# Patient Record
Sex: FEMALE | Race: WHITE | NOT HISPANIC OR LATINO | URBAN - METROPOLITAN AREA
[De-identification: names, ages, dates, MRNs, and addresses within clinical notes are randomized per-mention and may not be internally consistent; named-entity substitution may affect disease eponyms.]

---

## 2019-07-19 ENCOUNTER — OUTPATIENT (OUTPATIENT)
Dept: OUTPATIENT SERVICES | Facility: HOSPITAL | Age: 84
LOS: 1 days | End: 2019-07-19
Payer: MEDICARE

## 2019-07-19 PROBLEM — Z00.00 ENCOUNTER FOR PREVENTIVE HEALTH EXAMINATION: Status: ACTIVE | Noted: 2019-07-19

## 2019-07-20 PROCEDURE — 93010 ELECTROCARDIOGRAM REPORT: CPT

## 2019-07-25 ENCOUNTER — TRANSCRIPTION ENCOUNTER (OUTPATIENT)
Age: 84
End: 2019-07-25

## 2019-07-25 ENCOUNTER — OUTPATIENT (OUTPATIENT)
Dept: OUTPATIENT SERVICES | Facility: HOSPITAL | Age: 84
LOS: 1 days | End: 2019-07-25

## 2019-08-22 ENCOUNTER — OUTPATIENT (OUTPATIENT)
Dept: OUTPATIENT SERVICES | Facility: HOSPITAL | Age: 84
LOS: 1 days | End: 2019-08-22

## 2021-07-06 ENCOUNTER — OUTPATIENT (OUTPATIENT)
Dept: OUTPATIENT SERVICES | Facility: HOSPITAL | Age: 86
LOS: 1 days | End: 2021-07-06

## 2021-11-29 ENCOUNTER — APPOINTMENT (OUTPATIENT)
Dept: CT IMAGING | Facility: CLINIC | Age: 86
End: 2021-11-29
Payer: MEDICARE

## 2021-11-29 PROCEDURE — 74177 CT ABD & PELVIS W/CONTRAST: CPT | Mod: MH

## 2022-08-11 ENCOUNTER — OUTPATIENT (OUTPATIENT)
Dept: OUTPATIENT SERVICES | Facility: HOSPITAL | Age: 87
LOS: 1 days | End: 2022-08-11

## 2022-08-11 PROCEDURE — 93010 ELECTROCARDIOGRAM REPORT: CPT

## 2022-08-14 ENCOUNTER — NON-APPOINTMENT (OUTPATIENT)
Age: 87
End: 2022-08-14

## 2022-08-15 DIAGNOSIS — Z01.812 ENCOUNTER FOR PREPROCEDURAL LABORATORY EXAMINATION: ICD-10-CM

## 2022-08-15 DIAGNOSIS — K64.2 THIRD DEGREE HEMORRHOIDS: ICD-10-CM

## 2022-08-15 DIAGNOSIS — Z01.810 ENCOUNTER FOR PREPROCEDURAL CARDIOVASCULAR EXAMINATION: ICD-10-CM

## 2022-08-19 ENCOUNTER — OUTPATIENT (OUTPATIENT)
Dept: OUTPATIENT SERVICES | Facility: HOSPITAL | Age: 87
LOS: 1 days | End: 2022-08-19
Payer: MEDICARE

## 2022-08-19 PROCEDURE — 88305 TISSUE EXAM BY PATHOLOGIST: CPT | Mod: 26

## 2022-08-21 ENCOUNTER — NON-APPOINTMENT (OUTPATIENT)
Age: 87
End: 2022-08-21

## 2022-08-25 ENCOUNTER — OUTPATIENT (OUTPATIENT)
Dept: OUTPATIENT SERVICES | Facility: HOSPITAL | Age: 87
LOS: 1 days | End: 2022-08-25

## 2022-08-25 PROCEDURE — 88304 TISSUE EXAM BY PATHOLOGIST: CPT | Mod: 26

## 2022-09-01 DIAGNOSIS — K64.9 UNSPECIFIED HEMORRHOIDS: ICD-10-CM

## 2022-09-05 DIAGNOSIS — K64.9 UNSPECIFIED HEMORRHOIDS: ICD-10-CM

## 2022-09-12 ENCOUNTER — OUTPATIENT (OUTPATIENT)
Dept: EMERGENCY DEPT | Facility: HOSPITAL | Age: 87
LOS: 1 days | End: 2022-09-12

## 2022-09-12 PROCEDURE — 99285 EMERGENCY DEPT VISIT HI MDM: CPT

## 2022-09-19 DIAGNOSIS — K91.840 POSTPROCEDURAL HEMORRHAGE OF A DIGESTIVE SYSTEM ORGAN OR STRUCTURE FOLLOWING A DIGESTIVE SYSTEM PROCEDURE: ICD-10-CM

## 2022-11-07 ENCOUNTER — OFFICE (OUTPATIENT)
Dept: URBAN - METROPOLITAN AREA CLINIC 8 | Facility: CLINIC | Age: 87
Setting detail: OPHTHALMOLOGY
End: 2022-11-07
Payer: MEDICARE

## 2022-11-07 DIAGNOSIS — H00.15: ICD-10-CM

## 2022-11-07 PROCEDURE — 99213 OFFICE O/P EST LOW 20 MIN: CPT | Performed by: OPHTHALMOLOGY

## 2022-11-07 ASSESSMENT — REFRACTION_CURRENTRX
OS_OVR_VA: 20/
OD_CYLINDER: -0.75
OS_ADD: +2.75
OD_ADD: +2.75
OS_AXIS: 149
OS_AXIS: 154
OS_ADD: +2.75
OS_SPHERE: -1.25
OS_VPRISM_DIRECTION: PROGS
OS_SPHERE: -0.50
OD_SPHERE: +0.25
OD_OVR_VA: 20/
OD_CYLINDER: SPHERE
OD_VPRISM_DIRECTION: PROGS
OS_VPRISM_DIRECTION: PROGS
OD_SPHERE: -0.25
OD_OVR_VA: 20/
OD_AXIS: 064
OS_CYLINDER: -3.25
OS_OVR_VA: 20/
OD_VPRISM_DIRECTION: PROGS
OS_CYLINDER: -3.00
OD_ADD: +2.75

## 2022-11-07 ASSESSMENT — SPHEQUIV_DERIVED
OD_SPHEQUIV: 0.25
OS_SPHEQUIV: -2.375
OD_SPHEQUIV: 0.125
OS_SPHEQUIV: -3
OD_SPHEQUIV: 0.125
OS_SPHEQUIV: -2.125

## 2022-11-07 ASSESSMENT — REFRACTION_AUTOREFRACTION
OS_CYLINDER: -4.50
OS_AXIS: 148
OD_CYLINDER: -0.50
OD_AXIS: 109
OD_SPHERE: +0.50
OS_SPHERE: -0.75

## 2022-11-07 ASSESSMENT — AXIALLENGTH_DERIVED
OS_AL: 25.1667
OD_AL: 24.2013
OS_AL: 25.4469
OD_AL: 24.2013
OD_AL: 24.1502
OS_AL: 25.0564

## 2022-11-07 ASSESSMENT — REFRACTION_MANIFEST
OD_CYLINDER: -0.25
OS_CYLINDER: -3.25
OS_ADD: +2.75
OS_VA1: 20/NI
OS_AXIS: 150
OD_AXIS: 110
OU_VA: 20/25-2
OS_SPHERE: -0.50
OD_SPHERE: +0.25
OD_CYLINDER: -0.25
OD_VA1: 20/30
OS_SPHERE: -0.75
OD_VA2: 20/25
OS_VA1: 20/NI
OD_ADD: +2.75
OD_VA1: 20/30
OD_ADD: +2.75
OS_CYLINDER: -3.25
OD_VA2: 20/25
OS_AXIS: 150
OD_AXIS: 110
OS_VA2: 20/25
OD_SPHERE: +0.25
OS_VA2: 20/25
OU_VA: 20/25-2
OS_ADD: +2.75

## 2022-11-07 ASSESSMENT — VISUAL ACUITY
OS_BCVA: 20/40
OD_BCVA: 20/150

## 2022-11-07 ASSESSMENT — KERATOMETRY
OS_K2POWER_DIOPTERS: 44.00
OS_K1POWER_DIOPTERS: 40.00
OD_K2POWER_DIOPTERS: 41.75
OD_AXISANGLE_DEGREES: 090
OS_AXISANGLE_DEGREES: 057
METHOD_AUTO_MANUAL: AUTO
OD_K1POWER_DIOPTERS: 41.75

## 2022-11-07 ASSESSMENT — CONFRONTATIONAL VISUAL FIELD TEST (CVF)
OD_FINDINGS: FULL
OS_FINDINGS: FULL

## 2023-03-05 ENCOUNTER — NON-APPOINTMENT (OUTPATIENT)
Age: 88
End: 2023-03-05

## 2023-10-17 ENCOUNTER — OFFICE (OUTPATIENT)
Dept: URBAN - METROPOLITAN AREA CLINIC 8 | Facility: CLINIC | Age: 88
Setting detail: OPHTHALMOLOGY
End: 2023-10-17
Payer: MEDICARE

## 2023-10-17 DIAGNOSIS — Z96.1: ICD-10-CM

## 2023-10-17 DIAGNOSIS — H35.373: ICD-10-CM

## 2023-10-17 DIAGNOSIS — D31.31: ICD-10-CM

## 2023-10-17 DIAGNOSIS — H43.811: ICD-10-CM

## 2023-10-17 DIAGNOSIS — H52.4: ICD-10-CM

## 2023-10-17 PROCEDURE — 92015 DETERMINE REFRACTIVE STATE: CPT

## 2023-10-17 PROCEDURE — 92014 COMPRE OPH EXAM EST PT 1/>: CPT

## 2023-10-17 PROCEDURE — 92250 FUNDUS PHOTOGRAPHY W/I&R: CPT

## 2023-10-17 ASSESSMENT — AXIALLENGTH_DERIVED
OS_AL: 25.4501
OS_AL: 25.1699
OD_AL: 24.0544
OS_AL: 25.1699
OD_AL: 24.0544
OD_AL: 24.0544

## 2023-10-17 ASSESSMENT — KERATOMETRY
OS_K1POWER_DIOPTERS: 40.00
OS_K2POWER_DIOPTERS: 44.25
OS_AXISANGLE_DEGREES: 055
OD_K1POWER_DIOPTERS: 42.00
METHOD_AUTO_MANUAL: AUTO
OD_K2POWER_DIOPTERS: 42.00
OD_AXISANGLE_DEGREES: 090

## 2023-10-17 ASSESSMENT — REFRACTION_MANIFEST
OS_SPHERE: -0.50
OD_VA1: 20/40-
OD_VA2: 20/25
OD_SPHERE: +0.75
OS_VA1: 20/80
OS_CYLINDER: -4.00
OS_ADD: +2.75
OS_AXIS: 145
OS_SPHERE: -0.50
OD_VA2: 20/25
OD_SPHERE: +0.75
OD_AXIS: 085
OD_ADD: +2.75
OD_AXIS: 085
OD_CYLINDER: -1.00
OU_VA: 20/40
OS_ADD: +2.75
OU_VA: 20/40
OS_AXIS: 145
OS_VA2: 20/25
OS_VA2: 20/25
OD_CYLINDER: -1.00
OD_VA1: 20/40-
OS_CYLINDER: -4.00
OD_ADD: +2.75
OS_VA1: 20/80

## 2023-10-17 ASSESSMENT — REFRACTION_CURRENTRX
OD_CYLINDER: SPHERE
OS_SPHERE: -0.75
OD_CYLINDER: -0.25
OS_OVR_VA: 20/
OD_VPRISM_DIRECTION: PROGS
OS_AXIS: 145
OD_OVR_VA: 20/
OD_OVR_VA: 20/
OS_CYLINDER: -3.50
OS_ADD: +3.00
OD_OVR_VA: 20/
OS_ADD: +2.75
OS_SPHERE: -0.50
OD_ADD: +2.75
OD_CYLINDER: SPHERE
OD_SPHERE: +0.50
OD_SPHERE: +0.25
OS_CYLINDER: -3.25
OS_OVR_VA: 20/
OS_AXIS: 145
OD_SPHERE: +0.25
OD_VPRISM_DIRECTION: PROGS
OD_AXIS: 113
OS_OVR_VA: 20/
OS_VPRISM_DIRECTION: PROGS
OD_VPRISM_DIRECTION: PROGS
OS_ADD: +2.75
OD_ADD: +3.00
OD_ADD: +2.75
OS_CYLINDER: -4.00
OS_SPHERE: -0.50
OS_VPRISM_DIRECTION: PROGS
OS_VPRISM_DIRECTION: PROGS
OS_AXIS: 147

## 2023-10-17 ASSESSMENT — CONFRONTATIONAL VISUAL FIELD TEST (CVF)
OS_FINDINGS: FULL
OD_FINDINGS: FULL

## 2023-10-17 ASSESSMENT — SPHEQUIV_DERIVED
OS_SPHEQUIV: -2.5
OD_SPHEQUIV: 0.25
OD_SPHEQUIV: 0.25
OS_SPHEQUIV: -3.125
OS_SPHEQUIV: -2.5
OD_SPHEQUIV: 0.25

## 2023-10-17 ASSESSMENT — VISUAL ACUITY
OS_BCVA: 20/70
OD_BCVA: 20/125-

## 2023-10-17 ASSESSMENT — REFRACTION_AUTOREFRACTION
OD_SPHERE: +0.75
OS_SPHERE: -0.75
OD_AXIS: 086
OS_AXIS: 143
OD_CYLINDER: -1.00
OS_CYLINDER: -4.75

## 2023-11-17 ENCOUNTER — APPOINTMENT (OUTPATIENT)
Dept: ULTRASOUND IMAGING | Facility: CLINIC | Age: 88
End: 2023-11-17

## 2023-11-17 ENCOUNTER — APPOINTMENT (OUTPATIENT)
Dept: MAMMOGRAPHY | Facility: CLINIC | Age: 88
End: 2023-11-17
Payer: MEDICARE

## 2023-11-17 PROCEDURE — 76641 ULTRASOUND BREAST COMPLETE: CPT | Mod: RT,3G

## 2023-11-17 PROCEDURE — 77065 DX MAMMO INCL CAD UNI: CPT | Mod: RT

## 2023-11-17 PROCEDURE — G0279: CPT | Mod: RT

## 2023-11-30 ENCOUNTER — APPOINTMENT (OUTPATIENT)
Dept: ULTRASOUND IMAGING | Facility: CLINIC | Age: 88
End: 2023-11-30
Payer: MEDICARE

## 2023-11-30 ENCOUNTER — RESULT REVIEW (OUTPATIENT)
Age: 88
End: 2023-11-30

## 2023-11-30 PROCEDURE — A4648: CPT

## 2023-11-30 PROCEDURE — 19083 BX BREAST 1ST LESION US IMAG: CPT | Mod: RT

## 2023-11-30 PROCEDURE — 77065 DX MAMMO INCL CAD UNI: CPT | Mod: RT

## 2023-12-07 ENCOUNTER — APPOINTMENT (OUTPATIENT)
Dept: BREAST CENTER | Facility: CLINIC | Age: 88
End: 2023-12-07
Payer: MEDICARE

## 2023-12-07 VITALS
WEIGHT: 128 LBS | HEIGHT: 60 IN | BODY MASS INDEX: 25.13 KG/M2 | SYSTOLIC BLOOD PRESSURE: 148 MMHG | HEART RATE: 108 BPM | DIASTOLIC BLOOD PRESSURE: 83 MMHG | TEMPERATURE: 98.3 F

## 2023-12-07 DIAGNOSIS — Z63.4 DISAPPEARANCE AND DEATH OF FAMILY MEMBER: ICD-10-CM

## 2023-12-07 DIAGNOSIS — Z78.9 OTHER SPECIFIED HEALTH STATUS: ICD-10-CM

## 2023-12-07 DIAGNOSIS — Z80.49 FAMILY HISTORY OF MALIGNANT NEOPLASM OF OTHER GENITAL ORGANS: ICD-10-CM

## 2023-12-07 DIAGNOSIS — Z80.3 FAMILY HISTORY OF MALIGNANT NEOPLASM OF BREAST: ICD-10-CM

## 2023-12-07 PROCEDURE — 99204 OFFICE O/P NEW MOD 45 MIN: CPT

## 2023-12-07 RX ORDER — LACTOBACILLUS ACIDOPHILUS/PECT 30 MG-20MG
TABLET ORAL
Refills: 0 | Status: ACTIVE | COMMUNITY

## 2023-12-07 RX ORDER — MAGNESIUM OXIDE/MAG AA CHELATE 300 MG
CAPSULE ORAL
Refills: 0 | Status: ACTIVE | COMMUNITY

## 2023-12-07 RX ORDER — ROSUVASTATIN CALCIUM 5 MG/1
TABLET, FILM COATED ORAL
Refills: 0 | Status: ACTIVE | COMMUNITY

## 2023-12-07 SDOH — SOCIAL STABILITY - SOCIAL INSECURITY: DISSAPEARANCE AND DEATH OF FAMILY MEMBER: Z63.4

## 2023-12-08 ENCOUNTER — NON-APPOINTMENT (OUTPATIENT)
Age: 88
End: 2023-12-08

## 2023-12-12 ENCOUNTER — APPOINTMENT (OUTPATIENT)
Dept: MRI IMAGING | Facility: CLINIC | Age: 88
End: 2023-12-12
Payer: MEDICARE

## 2023-12-12 PROCEDURE — A9585: CPT

## 2023-12-12 PROCEDURE — 77049 MRI BREAST C-+ W/CAD BI: CPT

## 2023-12-13 ENCOUNTER — NON-APPOINTMENT (OUTPATIENT)
Age: 88
End: 2023-12-13

## 2023-12-18 ENCOUNTER — NON-APPOINTMENT (OUTPATIENT)
Age: 88
End: 2023-12-18

## 2023-12-20 ENCOUNTER — NON-APPOINTMENT (OUTPATIENT)
Age: 88
End: 2023-12-20

## 2023-12-20 ENCOUNTER — APPOINTMENT (OUTPATIENT)
Dept: PLASTIC SURGERY | Facility: CLINIC | Age: 88
End: 2023-12-20
Payer: MEDICARE

## 2023-12-20 VITALS
SYSTOLIC BLOOD PRESSURE: 140 MMHG | HEIGHT: 60 IN | BODY MASS INDEX: 25.13 KG/M2 | TEMPERATURE: 98.2 F | WEIGHT: 128 LBS | OXYGEN SATURATION: 96 % | DIASTOLIC BLOOD PRESSURE: 77 MMHG | HEART RATE: 106 BPM

## 2023-12-20 DIAGNOSIS — Z87.19 PERSONAL HISTORY OF OTHER DISEASES OF THE DIGESTIVE SYSTEM: ICD-10-CM

## 2023-12-20 DIAGNOSIS — Z42.1 ENCOUNTER FOR BREAST RECONSTRUCTION FOLLOWING MASTECTOMY: ICD-10-CM

## 2023-12-20 DIAGNOSIS — Z85.3 PERSONAL HISTORY OF MALIGNANT NEOPLASM OF BREAST: ICD-10-CM

## 2023-12-20 PROCEDURE — 99204 OFFICE O/P NEW MOD 45 MIN: CPT

## 2023-12-21 PROBLEM — Z87.19 HISTORY OF INTESTINAL OBSTRUCTION: Status: RESOLVED | Noted: 2023-12-20 | Resolved: 2023-12-21

## 2023-12-21 NOTE — HISTORY OF PRESENT ILLNESS
[FreeTextEntry1] : "I want to know my options'  Patient presents with her daughter and states that she is not certain of which surgical choice is best for her.  She states that she has had a left mastectomy TRAM flap reconstruction in 1990.  She states that initially, she had a mastectomy in 1982 followed by implant placement the following year which resulted in capsular contracture and revision x 2.  She then had the flap procedure at [facility] with Heriberto Castillo, and has not had any issues until this recent diagnosis of right breast cancer.  She would like to be a similar size on her right as left and thinks that the oncoplastic reconstruction choice is best for her.   She did also have a left nipple reconstruction with nipple sharing from the right, prior to the TRAM flap, but this was removed during the TRAM. She would consider left nipple/areolar reconstruction, but has not flet the need to pursue it after the TRAM.

## 2023-12-21 NOTE — ASSESSMENT
[FreeTextEntry1] : 87 y/o female with recent diagnosis of right breast cancer for plastic surgery consultation  We discussed breast reconstruction in general, including flap and implant-based reconstruction, as well as the option of oncoplastic breast reconstruction after lumpectomy.  Also planned is surgical revision of right reconstructed chest scar. This option seems to fit with pt's needs and expectations best, given the size of her breasts as well as her comorbidities. I believe she will achieve a good reconstructive result as well as some improvement of her asymmetry.   We reviewed the R/B/A of the procedure, including, but not limited to, the risks of decreased sensation or loss of sensation of the NAC, partial or complete NAC necrosis, wound healing problems requiring wound care, especially at the T-junction points of the incisions. I went over the different scar shapes and their positions on the breasts.  We also reviewed risks of surgery in general (bleeding or hematoma requiring a return to the operating room, and infection).  We also went over the potential for a loss of volume of the involved breast after radiation, and how this cannot be precisely predicted preoperatively, and may require further surgery to obtain symmetry.  Plan is to schedule surgery in the near future All questions and concerns addressed

## 2023-12-21 NOTE — REASON FOR VISIT
[Consultation] : a consultation visit [FreeTextEntry1] : Pt was seen by Dr. Gracia and states that she is here for a surgery consult.

## 2023-12-27 ENCOUNTER — NON-APPOINTMENT (OUTPATIENT)
Age: 88
End: 2023-12-27

## 2024-01-03 ENCOUNTER — APPOINTMENT (OUTPATIENT)
Dept: HEMATOLOGY ONCOLOGY | Facility: CLINIC | Age: 89
End: 2024-01-03

## 2024-01-05 ENCOUNTER — NON-APPOINTMENT (OUTPATIENT)
Age: 89
End: 2024-01-05

## 2024-01-05 NOTE — DISCUSSION/SUMMARY
[FreeTextEntry1] : The visit was provided via telehealth using real-time 2-way audio visual technology. The patient, Radha Morton, was located at home, [address] [place] [address], at the time of the visit. The Genetic Counselor, Esmer Gonzales, was located at the medical office located in [place], [place]. The patient and provider participated in the telehealth encounter. Her daughter, Virgie, also participated from [place], [place]. Consent for telehealth services was given on 1/3/2024 by the patient, Radha Morton.    REASON FOR CONSULT  Radha Morton is an 88-year-old female who was seen on 1/3/2024 for a discussion regarding her genetic testing results related to hereditary cancer predisposition. She was accompanied by her daughter, Virgie.   RELEVANT MEDICAL HISTORY  Ms. Morton reports a personal history of multiple skin cancers with the first instance occurring 30 years ago in her late 50s. She states she has not had a skin cancer in 10-15 years. 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  Ms. Morton also reports a personal history of uterine cancer 5 years ago in her early 80s treated with hysterectomy.  Ms. Morton reports a personal history of L-breast cancer at age 47 and is s/p L-mastectomy in  with no adjuvant therapy. Ms. Morton was recently diagnosed with R-breast ([facility]) in 2023 at age 88.  She plans to undergo lumpectomy with reconstruction.  Ms. 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 This test was reported on 12/15/2023 and ordered by Dr. Lolly Gracia. This mutation is classified by [facility] as a Variant of Uncertain Significance but is considered a pathogenic low penetrance mutation at other laboratories.    OTHER MEDICAL AND SURGICAL HISTORY:  Medical: Elevated cholesterol, diverticulitis, hemorrhoids  Surgical: implant capsule formation x2, TRAM in  at [facility], colostomy with colostomy reversal   PAST OB/GYN HISTORY:  Obstetrical History:   Age at Menarche: 12  Menopausal with LMP at age 70   Age at First Live Birth: 22  Oral Contraceptive Use: No  Hormone Replacement Therapy: Yes, Ms. Morton reports taking HRT via pill form (hormones unknown) for approximately 20 years total, which she stopped 10-15 years ago.     CANCER SCREENING HISTORY:    Breast:  23 US R-breast Complete/ Mammo R-breast Diag w Jairo: Biopsy is recommended for R-breast. BR4B  23 R-breast Bx: Invasive lobular carcinoma, measures at least 12mm. Concordant.   23 [facility] Breast MRI: Right breast biopsy-proven malignancy with associated mass and nonmass enhancement overall measuring up to 4.2 cm in widest AP dimension. Posterior margin is 1.2 cm away from the pectoralis major muscle. Continued surgical/oncologic management is advised. No right axillary lymphadenopathy. Status post left mastectomy with reconstruction demonstrating no suspicious enhancement on today's exam. BR6  Other Breast Biopsies: Ms. Morton reports prior breast biopsy with diagnosis in her 40s as well as 2 benign breast biopsies since her initial diagnosis that were performed approximately 15-20 years ago.   GYN: -	Most recent GYN annual exam: ; Frequency: every 6 months -	Most recent pap smear: ; Results: reportedly normal; Frequency: every 6 months -	Most recent transvaginal ultrasound: ; Results: reportedly normal -	CA-125: None Colon: -	Most recent colonoscopy: 1.5 years ago; Results: reportedly no polyps, showed bleeding hemorrhoids; Frequency: unkn by patient -	Total Polyps: 0 per patient Skin:   -	Most recent skin exam: ; Results: reported no lesions removed; Frequency: yearly   SOCIAL HISTORY:  -	Tobacco-product use: Never smoker -	Environmental exposure: No   FAMILY HISTORY:  Maternal ancestry was reported as Polish and Iranian (Ashkenazi Mosque) and paternal ancestry was reported as [place] (Ashkenazi Mosque). A detailed family history of cancer was ascertained, see below and scanned pedigree in chart.   Of note, Ms. Morton's sister reportedly pursued genetic testing via a Panel with [facility] which revealed the familial CHEK2 c.1283C>T variant. Additionally, Ms. Morton's daughter Virgie reportedly pursued testing via [facility] of the BRCA1/2 and CHEK2 genes, which was negative. She states that she was diagnosed with uterine cancer after she pursued this genetic testing. Ms. Morton's daughter, Virgie, had copies of these reports available during the visit which she dictated the results of via telehealth.   RESULTS INTERPRETATION AND ASSESSMENT:  We reviewed with Ms. 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 Please note, missense variants in CHEK2 such as this one are classified as low penetrant and likely do not confer the same level of cancer risks as protein truncating pathogenic variants in the CHEK2 gene.    We discussed that while the CHEK2 mutation likely played a role in the development of her breast cancer, it does not fully explain the breast cancer.    As part of our discussion, we reviewed the cancer risks associated with the CHEK2 (c.1283C>T; p.[id number]) mutation:   BREAST CANCER RISK:   The CHEK2 c.1283C>T (p.S428F) variant is associated with a modest increase in female breast cancer risk (approximately1.6-fold increased risk). Thus, the cumulative risk of female breast cancer through age 70 associated with this CHEK2 variant is likely near 13% compared to the general population risk of 8%.    COLORECTAL CANCER RISK:  The CHEK2 c.1283C>T (p.S428F) variant is associated with a modest increase in colorectal cancer risk (approximately 1.5-1.6-fold increased risk). Thus, the cumulative lifetime risk of colorectal cancer in unscreened individuals is approximately 6% for women and 7-8% for men compared to the general population risk of approximately 4.2-4.8% through age 85.   OTHER CANCER RISKS:  Studies have described a possible increased risk for a wide range of other cancers associated with CHEK2 mutations. However, these studies are not conclusive, and incremental screening or risk-reduction for these possible associations is not recommended at this time.    We also discussed that, while no cause of the patient's additional personal and family history of cancer was identified, this result, while reassuring, does entirely not rule out a hereditary cancer risk in the patient. It is possible, although unlikely, the patient has a mutation in one of the genes tested that is not detectable by this analysis, or has a mutation in a different gene, either known or unknown. It is also possible there is a hereditary cancer predisposition in the family, but the patient did not inherit it.    IMPLICATIONS FOR THE PATIENT:  Given Ms. Morton's personal and current reported family history of cancer, and her missense CHEK2 mutation, the following screening guidelines and risk-reducing recommendations were discussed:    BREAST:   - For unaffected individuals with a missense CHEK2 mutation without a strong family history of breast cancer, they are recommended to undergo breast cancer screening as per the general population recommendations. For unaffected individuals with a missense CHEK2 mutation with strong family history of breast cancer, they are recommended to participate in incremental breast cancer risk-reduction with annual mammograms and annual breast MRIs.  -Given Ms. Morton's current and past diagnoses of breast cancer, long-term management and surveillance should be based on Ms. Morton's post-treatment protocol as recommended by her oncology team.  - We reviewed the impact of the CHEK2 mutation on both management of her current breast cancer and prevention of future breast cancer. We reviewed that presence of a CHEK2 mutation does not rule out the possibility of breast conserving therapy if it is otherwise an option. We also reviewed that while the presence of a CHEK2 mutation increases the risk of contralateral breast cancer, the presence of a CHEK2 mutation, in the absence of other significant family history, is not generally considered, by itself, to be an indication for contralateral risk-reducing mastectomy. Of note, Ms. Morton's current breast cancer is located in the R-breast, and she is s/p L-mastectomy due to her prior L-breast cancer at age 47.  COLON:  - As per current NCCN guidelines, individuals with pathogenic CHEK2 mutations should undergo colonoscopy every 5 years beginning at age 40. However, given the low-penetrance status of this missense mutation, it may be reasonable to consider prolonging the interval between colonoscopy to every 5-10 years at the discretion of the patient's treating gastroenterologist.  - It is not clear, however, how long this incremental screening should be pursued. For patients older than approximately age 75, the frequency of colorectal cancer screening should likely be individualized. Given Ms. Morton's age, and the relatively small impact of missense CHEK2 mutations on colorectal cancer incidence, we discussed the incremental benefit of participating in more frequent colonoscopy is not entirely clear, but it is likely relatively limited.     GYN: Long-term management and surveillance should be based on Ms. Morton's post-treatment protocol as recommended by her oncology team.  DERM: Long-term management and surveillance should be based on Ms. Morton's post-treatment protocol as recommended by her care team.    OTHER:  - In the absence of other indications, Ms. Morton should practice age-appropriate cancer screening of other organ systems as recommended for the general population.    IMPLICATIONS FOR FAMILY MEMBERS:  This mutation is inherited in an autosomal dominant pattern. We recommend the patient's first-degree relatives, specifically her children, pursue genetic counseling and genetic testing as there is a 50% chance they also have the same mutation. Ms. Morton's daughter, Virgie, reportedly pursued testing of the BRCA1/2 and CHEK2 genes previously, which was negative. She states that she was diagnosed with uterine cancer after she initially pursued genetic testing, therefore, Virgie was encouraged to contact her care team to inquire about updated, expanded testing and meet with a genetic counselor for complete genetic counseling risk assessment given the change in her personal history of cancer.  Ms. Morton's sister already pursued genetic testing and is reported to share the familial CHEK2 c.1283C>T mutation, therefore we recommend Ms. Morton's niece also pursue genetic counseling and genetic testing as there is a 50% chance she also has the same mutation. Additionally, Ms. Morton's extended maternal and paternal relatives (such as cousins) may pursue genetic counseling risk assessment as it is currently unclear which side of the family this CHEK2 mutation was inherited from, and there is also maternal and paternal family history of breast cancer in Ms. Morton's cousins with Ashkenazi Mosque ancestry.   Ms. Morton was made aware that if any at-risk relatives wanted to pursue genetic testing any time in the future, we would be happy to see them and coordinate testing. If they are not local, they can locate a genetic counselor using the National Society of Genetic Counselors, Find a Genetic Counselor Tool (www.nsgc.org/findageneticcounselor).    For anyone who tests positive, the risk of passing on this mutation to a future generation is 50%.   RESOURCES & SUPPORT GROUPS  Facing Our Risk of cancer Empowered (FORCE): www.FacingOurRisk.org    Bright Amanda: www.[email or web address]     In addition, the oncology social workers at [facility] are available to assist with more referrals, if necessary.     PLAN:  1. See above note for recommended management.  2. We encouraged sharing these results with family members. They have a risk to have inherited the same mutation. Other family may benefit from genetic testing and should contact a certified genetic counselor specializing in cancer. Due to HIPAA and [place] laws, Genetics is unable to directly contact other family at risk, but we are available should family members wish to reach out to us  3. Family support resources and referrals were provided.   4. Patient informed consult note(s) will be available through their [facility] patient portal.   7. Genetic knowledge changes rapidly. We encouraged re-contacting Cancer Genetics every 2-3 years for any changes in screening recommendations or sooner if there are significant changes in personal or family history    For any additional questions please call Cancer Genetics at [phone].       Esmer Gonzales MS, [facility]  Genetic Counselor, Cancer Genetics     CC:   Patient  Dr. Lolly Gracia

## 2024-01-17 ENCOUNTER — APPOINTMENT (OUTPATIENT)
Dept: MRI IMAGING | Facility: CLINIC | Age: 89
End: 2024-01-17
Payer: MEDICARE

## 2024-01-17 ENCOUNTER — RESULT REVIEW (OUTPATIENT)
Age: 89
End: 2024-01-17

## 2024-01-17 PROCEDURE — 19085Z: CUSTOM | Mod: RT

## 2024-01-17 PROCEDURE — 19086Z: CUSTOM | Mod: RT

## 2024-01-17 PROCEDURE — A9585: CPT | Mod: JW

## 2024-01-18 ENCOUNTER — NON-APPOINTMENT (OUTPATIENT)
Age: 89
End: 2024-01-18

## 2024-01-18 ENCOUNTER — RESULT REVIEW (OUTPATIENT)
Age: 89
End: 2024-01-18

## 2024-01-18 PROCEDURE — 77065 DX MAMMO INCL CAD UNI: CPT | Mod: RT

## 2024-01-24 NOTE — PHYSICAL EXAM
[de-identified] : Right: 1:00 6N 2 cm tumor. Slight ecchymosis from bx. Left mastectomy with TRAM reconstructin. SARA on left.  [TextEntry] : Psych: Appropriate, NAD, A&Ox3 Neuro: Intact grossly, gait normal

## 2024-01-24 NOTE — HISTORY OF PRESENT ILLNESS
[FreeTextEntry1] : Referred by Dr. Светлнаа Campos  PCP: Dr. Medel  Patient is an 88-year-old female here today for consultation for R 2:00 [facility], receptors are pending.  She is her with her daughter, Jacqueline.  Pt lives in [place].  Pt denies any breast lesions, discharge or masses. Hx of L mastectomy in 1982, was 46 y/o. implant capsule formation x2 and then TRAM in 1990's at [facility]. Pt never had chemo or hormonal treatment or radiation. She states all her LN were removed but doesn't have any hospital records.   Pt had hysterectomy with Dr. Campos for uterine cancer 5 years ago.   11/17/23 NFR, R dMMG and US: Scattered FG. Underlying asymmetry with slight distortion is seen in the medial aspect of the right breast corresponding to the palpable area. R US: In the 2:00 region corresponding to the palpable lump is altered area of echotexture measuring up to 2cm. Subtle acoustic shadowing is seen. Biopsy is recommended. Rec US bx. BR4B  11/30/23 Elin, R 2:00 US bx: Invasive lobular carcinoma, Gr2, measures at least 12mm. No lymphovascular permeation seen. Receptors pending. Concordant. Rec surgical or oncological management.   Pt is AJ and never had genetic testing.  Pt was a neuropsychologist.  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

## 2024-01-24 NOTE — ADDENDUM
[FreeTextEntry1] : ER 90% and KS 5%, [id number],   [facility] panel results, negative with +VUS in CHEK2 gene.   MRI 12/7/23 Right side: 4.2 AP, 2.5, 2.4, would need marker placed in the anterior margin via MRI if pt decides on lumpectomy and oncoplastic. If she decides on mastectomy she would not need a marker placed. No axillary lymphadenopathy on MRI.

## 2024-01-24 NOTE — ASSESSMENT
[FreeTextEntry1] : Referred by Dr. Светлана Campos  PCP: Dr. Medel  Patient is an 88-year-old female here today for consultation for R 2:00 ILC, receptors are pending.  She is her with her daughter, Jacqueline.  Pt lives in [place].  Pt denies any breast lesions, discharge or masses. Hx of L mastectomy in 1982, was 48 y/o. implant capsule formation x2 and then TRAM in 1990's at [facility]. Pt never had chemo or hormonal treatment or radiation. She states all her LN were removed but doesn't have any hospital records.   Pt had hysterectomy with Dr. Campos for uterine cancer 5 years ago.   11/17/23 NFR, R dMMG and US: Scattered FG. Underlying asymmetry with slight distortion is seen in the medial aspect of the right breast corresponding to the palpable area. R US: In the 2:00 region corresponding to the palpable lump is altered area of echotexture measuring up to 2cm. Subtle acoustic shadowing is seen. Biopsy is recommended. Rec US bx. BR4B  11/30/23 Elin, R 2:00 US bx: Invasive lobular carcinoma, Gr2, measures at least 12mm. No lymphovascular permeation seen. Receptors pending. Concordant. Rec surgical or oncological management.   Pt is AJ and never had genetic testing.  Pt was a neuropsychologist.  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  CBE: Asymmetric: Right: 1:00 6N 2 cm tumor. Slight ecchymosis from bx. Left mastectomy with TRAM reconstruction. SARA on left.  No axillary or SC lymphadenopathy.  Long discussion with patient and daughter, Jacqueline, regarding the biopsy results from [facility], from 11/30/2023   showing ILC of the R breast at the 1 o'clock, 7 cm FTN, Grade 2, receptors pending, measuring 1.2 on bx and 2 cm on u/s. Reviewed extensively the options of mastectomy vs lumpectomy with the pros and cons for both.  With mastectomy, option reconstruction (expander/implant) discussed and reconstruction handout given and contact for plastic surgeon. Pt had TRAM already on the left side. Also discussed usually no radiation with mastectomy but pending final pathology.  Discussed usually no reexcision with mastectomy. Reviewed will need drain(s) with mastectomy. With lumpectomy, will need radiation and treatment was reviewed.  Will need localization. Also discussed will need negative margins and if too close or positive, may need reexcision(s).   If unable to achieve negative margins with reexcision(s), she  may need mastectomy.  HUGH may be an oncoplastic surgery candidate. The option of oncoplastic surgery was discussed.  Given size of tumor and age, will wait for receptors and MRI results and discuss further with medical oncology and radiation oncology about need for SLNBx.   Receptors are pending. Chemotherapy pending results of final pathology. May need additional tumor analysis such as oncotype Dx to determine need for chemotherapy. Genetic testing done today. Rec appts with Dr. Conde, radiation oncology (she lives in [place], may go to [facility]), and medical oncology once receptors are back.   PLAN: MRI Genetic testing Appt with Dr. Conde Receptors pending Appt with Johnna Antonio and Sanchez (once receptors are back). SOZO if SLN to be done.

## 2024-01-24 NOTE — DATA REVIEWED
[FreeTextEntry1] : 11/17/23 NFR, R dMMG and US: Scattered FG. Underlying asymmetry with slight distortion is seen in the medial aspect of the right breast corresponding to the palpable area. R US: In the 2:00 region corresponding to the palpable lump is altered area of echotexture measuring up to 2cm. Subtle acoustic shadowing is seen. Biopsy is recommended. Rec US bx. BR4B  11/30/23 [facility] 2:00 US bx: Invasive lobular carcinoma, Gr2, measures at least 12mm. No lymphovascular permeation seen. Receptors pending. Concordant. Rec surgical or oncological management.

## 2024-01-25 ENCOUNTER — APPOINTMENT (OUTPATIENT)
Dept: BREAST CENTER | Facility: HOSPITAL | Age: 89
End: 2024-01-25

## 2024-01-25 ENCOUNTER — APPOINTMENT (OUTPATIENT)
Dept: PLASTIC SURGERY | Facility: HOSPITAL | Age: 89
End: 2024-01-25
Payer: MEDICARE

## 2024-01-25 ENCOUNTER — RESULT REVIEW (OUTPATIENT)
Age: 89
End: 2024-01-25

## 2024-01-25 PROCEDURE — 19318 BREAST REDUCTION: CPT | Mod: RT

## 2024-01-25 PROCEDURE — 13101 CMPLX RPR TRUNK 2.6-7.5 CM: CPT | Mod: 59

## 2024-01-29 ENCOUNTER — NON-APPOINTMENT (OUTPATIENT)
Age: 89
End: 2024-01-29

## 2024-02-01 ENCOUNTER — APPOINTMENT (OUTPATIENT)
Dept: PLASTIC SURGERY | Facility: CLINIC | Age: 89
End: 2024-02-01
Payer: MEDICARE

## 2024-02-01 VITALS
BODY MASS INDEX: 24.94 KG/M2 | SYSTOLIC BLOOD PRESSURE: 140 MMHG | HEIGHT: 60 IN | DIASTOLIC BLOOD PRESSURE: 78 MMHG | WEIGHT: 127 LBS | OXYGEN SATURATION: 94 % | HEART RATE: 75 BPM

## 2024-02-01 PROCEDURE — 99024 POSTOP FOLLOW-UP VISIT: CPT

## 2024-02-01 NOTE — REASON FOR VISIT
[Follow-Up: _____] : a [unfilled] follow-up visit [FreeTextEntry1] : pt states no pain just some soreness, no discharge or bleeding

## 2024-02-01 NOTE — PHYSICAL EXAM
[NI] : Normal [de-identified] : NL respiratory effort noted  [de-identified] : Left recon breast healing well, with improved contour. Steri-strips intact [de-identified] : Right breast healing well. NAC intact, viable and sensate. Mild ecchymosis and edema bilaterally

## 2024-02-01 NOTE — ASSESSMENT
[FreeTextEntry1] : Doing well postop Pt interested in going back to exercising.  I recommended walking as much as she likes and doing gentle arm stretching and mobility exercises, but NOT to the point of pain.  Remove steri strips or trim them. Follow up in 2-3 weeks to check the incisions and remove any remaining steri strips.

## 2024-02-01 NOTE — HISTORY OF PRESENT ILLNESS
[FreeTextEntry1] : Pt reports she has done very well. No real pain, just some soreness. did not require any oxycodone Having some soreness on raising the right arm.

## 2024-02-14 ENCOUNTER — APPOINTMENT (OUTPATIENT)
Dept: BREAST CENTER | Facility: CLINIC | Age: 89
End: 2024-02-14
Payer: MEDICARE

## 2024-02-14 VITALS
SYSTOLIC BLOOD PRESSURE: 134 MMHG | BODY MASS INDEX: 24.8 KG/M2 | HEART RATE: 88 BPM | WEIGHT: 127 LBS | DIASTOLIC BLOOD PRESSURE: 64 MMHG | OXYGEN SATURATION: 97 %

## 2024-02-14 PROCEDURE — 99024 POSTOP FOLLOW-UP VISIT: CPT

## 2024-02-14 NOTE — HISTORY OF PRESENT ILLNESS
[FreeTextEntry1] : Referred by Dr. Светлана Campos PCP: Dr. 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 Conde for R 2:00 ILC, ER 90% and GA 5%, Ncn3ifk neg.  1/25/24 Surgical path: ILC 2.7cm Gr2, negative margins, closest 2mm-Superior. LCIS classic. No DCIS. Addl anterior margin with intraductal papilloma and FA. Addl medial margin with intraductal papilloma. Reactive changes from prior core bx. 0/1LN negative. pT2N0/1  She is her with her daughter, Jacqueline. Doing very well postop, states she only needed tylenol and healing well. No hematoma or cellulitis. Great ROM.  She did undergo genetic counseling given her personal history and +VUS.  Hx of L mastectomy in 1982, was 48 y/o. implant capsule formation x2 and then TRAM in 1990's at [facility]. Pt never had chemo or hormonal treatment or radiation. She states all her LN were removed but doesn't have any hospital records.  Pt had hysterectomy with Dr. Campos for uterine cancer 5 years ago.  11/17/23 NFR, R dMMG and US: Scattered FG. Underlying asymmetry with slight distortion is seen in the medial aspect of the right breast corresponding to the palpable area. R US: In the 2:00 region corresponding to the palpable lump is altered area of echotexture measuring up to 2cm. Subtle acoustic shadowing is seen. Biopsy is recommended. Rec US bx. BR4B  11/30/23 Elin R 2:00 US bx: Invasive lobular carcinoma, Gr2, measures at least 12mm. No lymphovascular permeation seen. ER 90% and GA 5%, Stw6hpz neg. Concordant. Rec surgical or oncological management.  12/12/23 Elin, MRI: Right side: 4.2 AP, 2.5, 2.4, would need marker placed in the anterior margin via MRI if pt decides on lumpectomy and oncoplastic. If she decides on mastectomy she would not need a marker placed. No axillary lymphadenopathy on MRI.  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 Pt is [place].

## 2024-02-14 NOTE — ASSESSMENT
[FreeTextEntry1] : Referred by Dr. Светлана Campos PCP: Dr. 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 Conde for R 2:00 ILC, ER 90% and AZ 5%, Nhe2dcr neg.  1/25/24 Surgical path: ILC 2.7cm Gr2, negative margins, closest 2mm-Superior. LCIS classic. No DCIS. Addl anterior margin with intraductal papilloma and FA. Addl medial margin with intraductal papilloma. Reactive changes from prior core bx. 0/1LN negative. pT2N0/1  She is her with her daughter, Jacqueline. Doing very well postop, states she only needed tylenol and healing well. No hematoma or cellulitis. Great ROM.  She did undergo genetic counseling given her personal history and +VUS.  Hx of L mastectomy in 1982, was 48 y/o. implant capsule formation x2 and then TRAM in 1990's at [facility]. Pt never had chemo or hormonal treatment or radiation. She states all her LN were removed but doesn't have any hospital records.  Pt had hysterectomy with Dr. Campos for uterine cancer 5 years ago.  11/17/23 NFR, R dMMG and US: Scattered FG. Underlying asymmetry with slight distortion is seen in the medial aspect of the right breast corresponding to the palpable area. R US: In the 2:00 region corresponding to the palpable lump is altered area of echotexture measuring up to 2cm. Subtle acoustic shadowing is seen. Biopsy is recommended. Rec US bx. BR4B  11/30/23 Elin R 2:00 US bx: Invasive lobular carcinoma, Gr2, measures at least 12mm. No lymphovascular permeation seen. ER 90% and AZ 5%, [id number] neg. Concordant. Rec surgical or oncological management.  12/12/23 Elin, MRI: Right side: 4.2 AP, 2.5, 2.4, would need marker placed in the anterior margin via MRI if pt decides on lumpectomy and oncoplastic. If she decides on mastectomy she would not need a marker placed. No axillary lymphadenopathy on MRI.  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 Pt is AJ.  CBE: Right- reduction scar healing well. R- axillary incision healing well. No hematoma, no cellulitis. Great ROM.  Left mastectomy with TRAM reconstruction- revision healing well.   Reviewed surgical path with patient and her daughter, margins negative, LN negative. Stage 2, T2N0/1 Will send for oncotype. Patient seeing Dr. Bradford and rad/onc Dr. Antonio after Oncotype results are back. Follow up with our office in 2-3 weeks.  PLAN:  Oncotype pending Follow up with Dr. Bradford and Dr. Antonio as schedule Follow up with our office in 2-3 weeks, [facility] in [place].

## 2024-02-14 NOTE — DATA REVIEWED
[FreeTextEntry1] : 1/25/24 Surgical path: ILC 2.7cm Gr2, negative margins, closest 2mm-Superior. LCIS classic. No DCIS. Addl anterior margin with intraductal papilloma and FA. Addl medial margin with intraductal papilloma. Reactive changes from prior core bx. 0/1LN negative. pT2N0/1

## 2024-02-14 NOTE — CONSULT LETTER
[Dear  ___] : Dear  [unfilled], [Courtesy Letter:] : I had the pleasure of seeing your patient, [unfilled], in my office today. [Please see my note below.] : Please see my note below. [Consult Closing:] : Thank you very much for allowing me to participate in the care of this patient.  If you have any questions, please do not hesitate to contact me. [Sincerely,] : Sincerely, [FreeTextEntry3] : Lolly Gracia MD

## 2024-02-15 ENCOUNTER — APPOINTMENT (OUTPATIENT)
Dept: PLASTIC SURGERY | Facility: CLINIC | Age: 89
End: 2024-02-15
Payer: MEDICARE

## 2024-02-15 VITALS
SYSTOLIC BLOOD PRESSURE: 146 MMHG | HEIGHT: 60 IN | TEMPERATURE: 98 F | HEART RATE: 105 BPM | OXYGEN SATURATION: 97 % | BODY MASS INDEX: 25.13 KG/M2 | WEIGHT: 128 LBS | DIASTOLIC BLOOD PRESSURE: 80 MMHG

## 2024-02-15 PROCEDURE — 99024 POSTOP FOLLOW-UP VISIT: CPT

## 2024-02-15 NOTE — ASSESSMENT
[FreeTextEntry1] : 89 y/o female for follow up  Healing well Advised to continue to apply vaseline to all incisions until pink and smooth. All questions and concerns addressed. Follow up in 1 month Plan and patient status as per Dr Conde

## 2024-02-15 NOTE — REASON FOR VISIT
[Follow-Up: _____] : a [unfilled] follow-up visit [FreeTextEntry1] : had lumpectomy and reconstruction procedure on the rt breast. States she had the procedure done on 01/25/2024.

## 2024-02-15 NOTE — HISTORY OF PRESENT ILLNESS
[FreeTextEntry1] : "I feel good"  Patient presents for follow up and states she is doing well.  She is awaiting more results from Dr Gracia at this time.  She states she did not remove the steri strips from her right breast and admits to an increase in sensation of the nipple.  She states that she never felt that amount of sensitivity before.  She also states that she has some soreness under the right armpit where the lymph node biopsy was performed.  She is tired from walking around [place], but states she is overall doing well. No other complaints today.

## 2024-02-15 NOTE — ADDENDUM
[FreeTextEntry1] : All medical record entries were at my direction and personally dictated by me (Dr. Hannah Conde). I have reviewed the chart and agree that the record accurately reflects my personal performance of the history, physical exam, assessment and plan.

## 2024-02-15 NOTE — PHYSICAL EXAM
[NI] : Normal [Bra Size: _______] : Bra Size: [unfilled] [de-identified] : No left breast, s/p mastectomy with flap reconstruction Right breast : Incisions with steri strips around the areola, removed and there are some small scabs noted.   No dehiscence, erythema, or oozing.  The right nipple is highly sensitive. Right axillary incision is smooth and there is firmness noted and some swelling.  Mild tenderness on palpation noted.  No nipple discharge noted. [de-identified] : Left reconstructed chest scar revision is C/D/I, soft and nontender throughout. [de-identified] : Multiple scars from TRAM flap and other surgeries (see history)

## 2024-02-21 ENCOUNTER — NON-APPOINTMENT (OUTPATIENT)
Age: 89
End: 2024-02-21

## 2024-03-07 ENCOUNTER — APPOINTMENT (OUTPATIENT)
Dept: BREAST CENTER | Facility: CLINIC | Age: 89
End: 2024-03-07
Payer: MEDICARE

## 2024-03-07 VITALS
OXYGEN SATURATION: 90 % | BODY MASS INDEX: 25.13 KG/M2 | TEMPERATURE: 97.3 F | WEIGHT: 128 LBS | HEIGHT: 60 IN | DIASTOLIC BLOOD PRESSURE: 90 MMHG | SYSTOLIC BLOOD PRESSURE: 158 MMHG | HEART RATE: 96 BPM

## 2024-03-07 PROCEDURE — 99024 POSTOP FOLLOW-UP VISIT: CPT

## 2024-03-07 NOTE — ASSESSMENT
[FreeTextEntry1] : Referred by Dr. Светлана Campos PCP: Dr. 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 Conde for R 2:00 ILC, ER 90% and PA 5%, [id number] neg. 1/25/24 Surgical path: ILC 2.7cm Gr2, negative margins, closest 2mm-Superior. LCIS classic. No DCIS. Addl anterior margin with intraductal papilloma and FA. Addl medial margin with intraductal papilloma. Reactive changes from prior core bx. 0/1LN negative. pT2N0/1  She is her with her daughter, Jacqueline. Doing very well postop, healing well. No hematoma or cellulitis. Great ROM. She did undergo genetic counseling given her personal history and +VUS. Oncotype DX Recurrence Score resulted as 20, <1% CT benefit. Seeing Dr. Bradford.  Hx of L mastectomy in 1982, was 46 y/o. implant capsule formation x2 and then TRAM in 1990's at [facility]. Pt never had chemo or hormonal treatment or radiation. She states all her LN were removed but doesn't have any hospital records.  Pt had hysterectomy with Dr. Campos for uterine cancer 5 years ago.  11/17/23 NFR, R dMMG and US: Scattered FG. Underlying asymmetry with slight distortion is seen in the medial aspect of the right breast corresponding to the palpable area. R US: In the 2:00 region corresponding to the palpable lump is altered area of echotexture measuring up to 2cm. Subtle acoustic shadowing is seen. Biopsy is recommended. Rec US bx. BR4B  11/30/23 Elin R 2:00 US bx: Invasive lobular carcinoma, Gr2, measures at least 12mm. No lymphovascular permeation seen. ER 90% and PA 5%, Gng5jjd neg. Concordant. Rec surgical or oncological management.  12/12/23 Elin, MRI: Right side: 4.2 AP, 2.5, 2.4, would need marker placed in the anterior margin via MRI if pt decides on lumpectomy and oncoplastic. If she decides on mastectomy she would not need a marker placed. No axillary lymphadenopathy on MRI.  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 Pt is AJ.  CBE: Right- reduction scar healing well, slight healing bruising inner quadrants. R- axillary incision healing well. No cellulitis. Great ROM. Left mastectomy with TRAM reconstruction- revision healing well. Reviewed oncotype results, follow up with Dr. Bradford as scheduled. Encouraged patient schedule postop appt with rad/onc.   PLAN: Follow up with Dr. Bradford and Dr. Antonio as schedule Follow up with our office in 2-3 weeks, can see in [place].

## 2024-03-07 NOTE — CONSULT LETTER
[Consult Letter:] : I had the pleasure of evaluating your patient, [unfilled]. [Dear  ___] : Dear  [unfilled], [Consult Closing:] : Thank you very much for allowing me to participate in the care of this patient.  If you have any questions, please do not hesitate to contact me. [Please see my note below.] : Please see my note below. [Sincerely,] : Sincerely, [FreeTextEntry3] : Lolly Gracia MD

## 2024-03-07 NOTE — HISTORY OF PRESENT ILLNESS
[FreeTextEntry1] : Referred by Dr. Светлана Campos PCP: Dr. 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 Conde for R 2:00 ILC, ER 90% and OH 5%, Riu4yor neg. 1/25/24 Surgical path: ILC 2.7cm Gr2, negative margins, closest 2mm-Superior. LCIS classic. No DCIS. Addl anterior margin with intraductal papilloma and FA. Addl medial margin with intraductal papilloma. Reactive changes from prior core bx. 0/1LN negative. pT2N0/1  She is her with her daughter, Jacqueline. Doing very well postop, healing well. No hematoma or cellulitis. Great ROM. She did undergo genetic counseling given her personal history and +VUS. Oncotype DX Recurrence Score resulted as 20, <1% CT benefit. Seeing Dr. Bradford.  Hx of L mastectomy in 1982, was 46 y/o. implant capsule formation x2 and then TRAM in 1990's at [facility]. Pt never had chemo or hormonal treatment or radiation. She states all her LN were removed but doesn't have any hospital records.  Pt had hysterectomy with Dr. Campos for uterine cancer 5 years ago.  11/17/23 NFR, R dMMG and US: Scattered FG. Underlying asymmetry with slight distortion is seen in the medial aspect of the right breast corresponding to the palpable area. R US: In the 2:00 region corresponding to the palpable lump is altered area of echotexture measuring up to 2cm. Subtle acoustic shadowing is seen. Biopsy is recommended. Rec US bx. BR4B  11/30/23 Elin R 2:00 US bx: Invasive lobular carcinoma, Gr2, measures at least 12mm. No lymphovascular permeation seen. ER 90% and OH 5%, Kof3lbw neg. Concordant. Rec surgical or oncological management.  12/12/23 Elin, MRI: Right side: 4.2 AP, 2.5, 2.4, would need marker placed in the anterior margin via MRI if pt decides on lumpectomy and oncoplastic. If she decides on mastectomy she would not need a marker placed. No axillary lymphadenopathy on MRI.  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 Pt is AJ.

## 2024-03-07 NOTE — PHYSICAL EXAM
[de-identified] : Right- reduction scar healing well, slight healing bruising inner quadrants. R- axillary incision healing well. No cellulitis. Great ROM.

## 2024-03-07 NOTE — PAST MEDICAL HISTORY
[Menarche Age ____] : age at menarche was [unfilled] [History of Hormone Replacement Treatment] : has a history of hormone replacement treatment [Approximately ___] : the LMP was approximately [unfilled] [Total Preg ___] : G[unfilled] [Living ___] : Living: [unfilled] [FreeTextEntry7] : yes [FreeTextEntry2] : 2 miscarriages.  [FreeTextEntry8] : no

## 2024-03-14 ENCOUNTER — APPOINTMENT (OUTPATIENT)
Dept: PLASTIC SURGERY | Facility: CLINIC | Age: 89
End: 2024-03-14
Payer: MEDICARE

## 2024-03-14 VITALS
DIASTOLIC BLOOD PRESSURE: 78 MMHG | BODY MASS INDEX: 25.52 KG/M2 | TEMPERATURE: 97.7 F | SYSTOLIC BLOOD PRESSURE: 130 MMHG | OXYGEN SATURATION: 95 % | HEART RATE: 88 BPM | HEIGHT: 60 IN | WEIGHT: 130 LBS

## 2024-03-14 DIAGNOSIS — Z09 ENCOUNTER FOR FOLLOW-UP EXAMINATION AFTER COMPLETED TREATMENT FOR CONDITIONS OTHER THAN MALIGNANT NEOPLASM: ICD-10-CM

## 2024-03-14 PROCEDURE — 99024 POSTOP FOLLOW-UP VISIT: CPT

## 2024-03-14 RX ORDER — OXYCODONE 5 MG/1
5 TABLET ORAL EVERY 6 HOURS
Qty: 5 | Refills: 0 | Status: COMPLETED | COMMUNITY
Start: 2024-01-24 | End: 2024-03-14

## 2024-03-14 NOTE — REASON FOR VISIT
[Follow-Up: _____] : a [unfilled] follow-up visit [Family Member] : family member [FreeTextEntry1] : patient states she is still having some tenderness around the site but is doing much better. She states she is starting radiation on 03/20/24.

## 2024-03-14 NOTE — PHYSICAL EXAM
[NI] : Normal [de-identified] : NL respiratory effort noted  [de-identified] : Left recon breast healing well, with improved contour. [de-identified] : Right breast healing well. NAC intact, viable and sensate. Resolved ecchymosis and edema bilaterally

## 2024-03-27 ENCOUNTER — APPOINTMENT (OUTPATIENT)
Dept: BREAST CENTER | Facility: CLINIC | Age: 89
End: 2024-03-27
Payer: MEDICARE

## 2024-03-27 VITALS
OXYGEN SATURATION: 94 % | DIASTOLIC BLOOD PRESSURE: 78 MMHG | WEIGHT: 128 LBS | HEART RATE: 86 BPM | BODY MASS INDEX: 25.13 KG/M2 | TEMPERATURE: 97.3 F | SYSTOLIC BLOOD PRESSURE: 150 MMHG | HEIGHT: 60 IN

## 2024-03-27 DIAGNOSIS — C50.211 MALIGNANT NEOPLASM OF UPPER-INNER QUADRANT OF RIGHT FEMALE BREAST: ICD-10-CM

## 2024-03-27 PROCEDURE — 99024 POSTOP FOLLOW-UP VISIT: CPT

## 2024-03-27 NOTE — PAST MEDICAL HISTORY
[Menarche Age ____] : age at menarche was [unfilled] [History of Hormone Replacement Treatment] : has a history of hormone replacement treatment [Approximately ___] : the LMP was approximately [unfilled] [Living ___] : Living: [unfilled] [Total Preg ___] : G[unfilled] [FreeTextEntry2] : 2 miscarriages.  [FreeTextEntry7] : yes [FreeTextEntry8] : no

## 2024-03-27 NOTE — HISTORY OF PRESENT ILLNESS
[FreeTextEntry1] : Referred by Dr. Светлана Campos PCP: Dr. 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 Conde for R 2:00 ILC, ER 90% and IA 5%, Ctd4eif neg. 1/25/24 Surgical path: ILC 2.7cm Gr2, negative margins, closest 2mm-Superior. LCIS classic. No DCIS. Addl anterior margin with intraductal papilloma and FA. Addl medial margin with intraductal papilloma. Reactive changes from prior core bx. 0/1LN negative. pT2N0/1  Doing very well postop, healing well. No hematoma or cellulitis. Great ROM. She has started RT with Dr. Antonio. Has been using Miaderm. She did undergo genetic counseling given her personal history and +VUS. Oncotype DX Recurrence Score resulted as 20, <1% CT benefit. Seeing Dr. Bradford.  Hx of L mastectomy in 1982, was 46 y/o. implant capsule formation x2 and then TRAM in 1990's at [facility]. Pt never had chemo or hormonal treatment or radiation. She states all her LN were removed but doesn't have any hospital records.  Pt had hysterectomy with Dr. Campos for uterine cancer 5 years ago.  11/17/23 NFR, R dMMG and US: Scattered FG. Underlying asymmetry with slight distortion is seen in the medial aspect of the right breast corresponding to the palpable area. R US: In the 2:00 region corresponding to the palpable lump is altered area of echotexture measuring up to 2cm. Subtle acoustic shadowing is seen. Biopsy is recommended. Rec US bx. BR4B  11/30/23 GRISEL Worthington 2:00 US bx: Invasive lobular carcinoma, Gr2, measures at least 12mm. No lymphovascular permeation seen. ER 90% and IA 5%, Gjo8rhi neg. Concordant. Rec surgical or oncological management.  12/12/23 Elin, MRI: Right side: 4.2 AP, 2.5, 2.4, would need marker placed in the anterior margin via MRI if pt decides on lumpectomy and oncoplastic. If she decides on mastectomy she would not need a marker placed. No axillary lymphadenopathy on MRI.  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 Pt is AJ.

## 2024-03-27 NOTE — CONSULT LETTER
[Consult Letter:] : I had the pleasure of evaluating your patient, [unfilled]. [Dear  ___] : Dear  [unfilled], [Consult Closing:] : Thank you very much for allowing me to participate in the care of this patient.  If you have any questions, please do not hesitate to contact me. [Please see my note below.] : Please see my note below. [Sincerely,] : Sincerely, [FreeTextEntry3] : Lolyl Gracia MD [DrKarly  ___] : Dr. LAGUNAS

## 2024-03-27 NOTE — PHYSICAL EXAM
[de-identified] : Right- reduction scar healing well. R- axillary incision healing well. Slight erythema s/p radiation. Great ROM. [de-identified] : Left mastectomy with TRAM reconstruction- revision healing well.

## 2024-03-27 NOTE — ASSESSMENT
[FreeTextEntry1] : Referred by Dr. Светлана Campos PCP: Dr. 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 Conde for R 2:00 ILC, ER 90% and IA 5%, Gbn5rcw neg. 1/25/24 Surgical path: ILC 2.7cm Gr2, negative margins, closest 2mm-Superior. LCIS classic. No DCIS. Addl anterior margin with intraductal papilloma and FA. Addl medial margin with intraductal papilloma. Reactive changes from prior core bx. 0/1LN negative. pT2N0/1  Doing very well postop, healing well. No hematoma or cellulitis. Great ROM. She has started RT with Dr. Antonio. Has been using Miaderm. She did undergo genetic counseling given her personal history and +VUS. Oncotype DX Recurrence Score resulted as 20, <1% CT benefit. Seeing Dr. Bradford.  Hx of L mastectomy in 1982, was 48 y/o. implant capsule formation x2 and then TRAM in 1990's at [facility]. Pt never had chemo or hormonal treatment or radiation. She states all her LN were removed but doesn't have any hospital records.  Pt had hysterectomy with Dr. Campos for uterine cancer 5 years ago.  11/17/23 NFR, R dMMG and US: Scattered FG. Underlying asymmetry with slight distortion is seen in the medial aspect of the right breast corresponding to the palpable area. R US: In the 2:00 region corresponding to the palpable lump is altered area of echotexture measuring up to 2cm. Subtle acoustic shadowing is seen. Biopsy is recommended. Rec US bx. BR4B  11/30/23 GRISEL Worthington 2:00 US bx: Invasive lobular carcinoma, Gr2, measures at least 12mm. No lymphovascular permeation seen. ER 90% and IA 5%, [id number] neg. Concordant. Rec surgical or oncological management.  12/12/23 Elin, MRI: Right side: 4.2 AP, 2.5, 2.4, would need marker placed in the anterior margin via MRI if pt decides on lumpectomy and oncoplastic. If she decides on mastectomy she would not need a marker placed. No axillary lymphadenopathy on MRI.  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 Pt is AJ.  CBE: Right- reduction scar healing well. R- axillary incision healing well. Slight erythema s/p radiation. Great ROM. Left mastectomy with TRAM reconstruction- revision healing well. Continue RT as scheduled. To start AI per Dr. Bradford once radiation is complete. R dMMG due 7/24, follow up after  PLAN: Continue RT as scheduled with Dr. Antonio Appt with Dr. Bradford after. Right mmg 7/24 at [facility].  F.u after.

## 2024-04-22 ENCOUNTER — NON-APPOINTMENT (OUTPATIENT)
Age: 89
End: 2024-04-22

## 2024-06-05 ENCOUNTER — APPOINTMENT (OUTPATIENT)
Dept: PLASTIC SURGERY | Facility: CLINIC | Age: 89
End: 2024-06-05
Payer: MEDICARE

## 2024-06-05 VITALS
SYSTOLIC BLOOD PRESSURE: 136 MMHG | BODY MASS INDEX: 25.52 KG/M2 | HEART RATE: 94 BPM | DIASTOLIC BLOOD PRESSURE: 82 MMHG | HEIGHT: 60 IN | TEMPERATURE: 97.5 F | OXYGEN SATURATION: 97 % | WEIGHT: 130 LBS

## 2024-06-05 DIAGNOSIS — N63.10 UNSPECIFIED LUMP IN THE RIGHT BREAST, UNSPECIFIED QUADRANT: ICD-10-CM

## 2024-06-05 DIAGNOSIS — Z90.12 ACQUIRED ABSENCE OF LEFT BREAST AND NIPPLE: ICD-10-CM

## 2024-06-05 DIAGNOSIS — L90.5 SCAR CONDITIONS AND FIBROSIS OF SKIN: ICD-10-CM

## 2024-06-05 DIAGNOSIS — Z98.890 OTHER SPECIFIED POSTPROCEDURAL STATES: ICD-10-CM

## 2024-06-05 PROCEDURE — 99214 OFFICE O/P EST MOD 30 MIN: CPT

## 2024-06-05 RX ORDER — MULTIVIT-MIN/IRON/FOLIC ACID/K 18-600-40
CAPSULE ORAL
Refills: 0 | Status: ACTIVE | COMMUNITY

## 2024-06-05 RX ORDER — EXEMESTANE 25 MG/1
TABLET ORAL
Refills: 0 | Status: ACTIVE | COMMUNITY

## 2024-06-14 NOTE — HISTORY OF PRESENT ILLNESS
[FreeTextEntry1] : "I feel ok"  Patient presents for follow up and states that she had completed her RT about 6 weeks ago.  She states that she is having some side effects to one of the new medications she is taking.  She states that her LFT's are elevated, and she is going for a CT of the liver shortly.  She states that there is also a firm spot in the right breast that she has felt since the surgery.  She states the radiation made her tired and the skin a bit irritated, but overall, she is feeling fine.  She has no other complaints today.

## 2024-06-14 NOTE — ASSESSMENT
[FreeTextEntry1] : 89 y/o female for follow up   Right breast mass.  Patient is scheduled for a mammogram in 7/24 as per Dr Gracia.  Caridad MCFADDEN to schedule targeted ultrasound for this patient and will call her to discuss the need.  Follow up in 3 months All questions and concerns addressed. Plan and patient status as per dr Conde

## 2024-06-14 NOTE — PHYSICAL EXAM
[NI] : Normal [Bra Size: _______] : Bra Size: [unfilled] [de-identified] : Left reconstructed chest scar revision is C/D/I, soft and nontender throughout. [de-identified] : No left breast, s/p mastectomy with flap reconstruction Right breast: Incisions all healed well with a small 3 cm, firm, mobile mass noted in the 1 o'clock position above and mobile to the pectoralis muscle. Likely fat necrosis or internal scars/stitches.  Nontender to palpation.   Right axillary incision is healed well. [de-identified] : Multiple scars from TRAM flap and other surgeries (see history)

## 2024-06-14 NOTE — REASON FOR VISIT
[Follow-Up: _____] : a [unfilled] follow-up visit [FreeTextEntry1] : patient states she finished radiation about a month ago.

## 2024-08-01 ENCOUNTER — APPOINTMENT (OUTPATIENT)
Dept: MAMMOGRAPHY | Facility: CLINIC | Age: 89
End: 2024-08-01
Payer: MEDICARE

## 2024-08-01 ENCOUNTER — RESULT REVIEW (OUTPATIENT)
Age: 89
End: 2024-08-01

## 2024-08-01 ENCOUNTER — APPOINTMENT (OUTPATIENT)
Dept: ULTRASOUND IMAGING | Facility: CLINIC | Age: 89
End: 2024-08-01
Payer: MEDICARE

## 2024-08-01 PROCEDURE — 77065 DX MAMMO INCL CAD UNI: CPT | Mod: RT

## 2024-08-01 PROCEDURE — 76642 ULTRASOUND BREAST LIMITED: CPT | Mod: RT

## 2024-08-01 PROCEDURE — G0279: CPT | Mod: RT

## 2024-08-14 ENCOUNTER — APPOINTMENT (OUTPATIENT)
Dept: BREAST CENTER | Facility: CLINIC | Age: 89
End: 2024-08-14
Payer: MEDICARE

## 2024-08-14 DIAGNOSIS — N64.1 FAT NECROSIS OF BREAST: ICD-10-CM

## 2024-08-14 DIAGNOSIS — Z80.49 FAMILY HISTORY OF MALIGNANT NEOPLASM OF OTHER GENITAL ORGANS: ICD-10-CM

## 2024-08-14 DIAGNOSIS — Z80.3 FAMILY HISTORY OF MALIGNANT NEOPLASM OF BREAST: ICD-10-CM

## 2024-08-14 DIAGNOSIS — C50.211 MALIGNANT NEOPLASM OF UPPER-INNER QUADRANT OF RIGHT FEMALE BREAST: ICD-10-CM

## 2024-08-14 DIAGNOSIS — R92.30 DENSE BREASTS, UNSPECIFIED: ICD-10-CM

## 2024-08-14 PROCEDURE — 99214 OFFICE O/P EST MOD 30 MIN: CPT

## 2024-08-14 NOTE — PAST MEDICAL HISTORY
[Menarche Age ____] : age at menarche was [unfilled] [History of Hormone Replacement Treatment] : has a history of hormone replacement treatment [Approximately ___] : the LMP was approximately [unfilled] [Total Preg ___] : G[unfilled] [Living ___] : Living: [unfilled] [FreeTextEntry2] : 2 miscarriages.  [FreeTextEntry7] : yes [FreeTextEntry8] : no

## 2024-08-14 NOTE — CONSULT LETTER
[Dear  ___] : Dear  [unfilled], [Courtesy Letter:] : I had the pleasure of seeing your patient, [unfilled], in my office today. [Please see my note below.] : Please see my note below. [Consult Closing:] : Thank you very much for allowing me to participate in the care of this patient.  If you have any questions, please do not hesitate to contact me. [Sincerely,] : Sincerely, [DrKarly  ___] : Dr. LAGUNAS [FreeTextEntry3] : Lolly Gracia MD

## 2024-08-14 NOTE — ASSESSMENT
[FreeTextEntry1] : Referred by Dr. Светлана Campos PCP: Dr. 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 She is s/p 1/25/24 R lumpectomy and SLNBx and oncoplastics per Dr. Conde for R 2:00 ILC, ER 90% and NE 5%, [id number] neg. 1/25/24 Surgical path: ILC 2.7cm Gr2, negative margins, closest 2mm-Superior. LCIS classic. No DCIS. Addl anterior margin with intraductal papilloma and FA. Addl medial margin with intraductal papilloma. Reactive changes from prior core bx. 0/1LN negative. pT2N0/1  Completed RT with Dr. Antonio 4/10/24.  Oncotype DX Recurrence Score resulted as 20, <1% CT benefit. Seeing Dr. Bradford, started on Aromasin. Per Dr. Bradford's last note pt saw her PCP for elevated LFTs, CT abd was performed no liver lesion however sclerotic lesion in L3 which prompted PET/CT order. PET/CT NFR 7/8/24 was negative for metastatic disease. She did undergo genetic counseling given her personal history and +VUS.  Hx of L mastectomy in 1982, was 46 y/o. implant capsule formation x2 and then TRAM in 1990's at [facility]. Pt never had chemo or hormonal treatment or radiation. She states all her LN were removed but doesn't have any hospital records.  Pt had hysterectomy with Dr. aCmpos for uterine cancer 5 years ago.  11/30/23 [facility]thing[facility] 2:00 US bx: Invasive lobular carcinoma, Gr2, measures at least 12mm. No lymphovascular permeation seen. ER 90% and NE 5%, [id number] neg. Concordant. Rec surgical or oncological management.  7/8/24 [facility], PET/CT: S/p right breast lumpectomy. Mild activity in surgical site is most likely associated with postop changes. Suggest to f/u. Post radiation inflammation in the anterior right upper lobe. Otherwise no FDG avid LN or lesion in the field of view to suggest metastatic dx.   8/1/24 NFR, R dmmg and limited US: Het dense. No susp mass, microcals or other sign of malignancy is identified. Post surgical changes are noted. R US: No suspicious solid mass. Postsurgical changes with fat necrosis is seen. No underlying fluid collection. Impression: no mmg or US evidence of malignancy. Rec mmg in 1yr. 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 Pt is AJ.  CBE:  Right- reduction scar healed well. Fat necrosis above scar as noted in U/s.  R- axillary incision healing well. Slight erythema s/p radiation. Great ROM.  Left mastectomy with TRAM reconstruction- revision healed well. On Aromasin per Dr. Bradford. To address shoulder stiffness with her.  Had EBRT with DR. Antonio. R dMMG, dense breasts, R u/s due 1/25, follow up after. No SOZO given bilat axillary surgery.  PLAN: F/u Dr. Antonio, Dr. Bradford and Dr. Conde as scheduled.  Right mmg and u/s 1/25 at [facility]. F.u after.

## 2024-08-14 NOTE — PHYSICAL EXAM
[Normocephalic] : normocephalic [Atraumatic] : atraumatic [Supple] : supple [No Supraclavicular Adenopathy] : no supraclavicular adenopathy [No Thyromegaly] : no thyromegaly [Examined in the supine and seated position] : examined in the supine and seated position [Asymmetrical] : asymmetrical [No dominant masses] : no dominant masses left breast [No Nipple Retraction] : no left nipple retraction [No Nipple Discharge] : no left nipple discharge [No Axillary Lymphadenopathy] : no left axillary lymphadenopathy [No Edema] : no edema [No Swelling] : no swelling [Full ROM] : full range of motion [No Rashes] : no rashes [No Ulceration] : no ulceration [de-identified] : Right- reduction scar healed well with fat necrosis above NAC. R- axillary incision healing well. Great ROM.  Left mastectomy with TRAM reconstruction- revision healed well. [TextEntry] : Psych: Appropriate, NAD, A&Ox3 Neuro: Intact grossly, gait normal

## 2024-08-14 NOTE — PHYSICAL EXAM
[Normocephalic] : normocephalic [Atraumatic] : atraumatic [Supple] : supple [No Supraclavicular Adenopathy] : no supraclavicular adenopathy [No Thyromegaly] : no thyromegaly [Examined in the supine and seated position] : examined in the supine and seated position [Asymmetrical] : asymmetrical [No dominant masses] : no dominant masses left breast [No Nipple Retraction] : no left nipple retraction [No Nipple Discharge] : no left nipple discharge [No Axillary Lymphadenopathy] : no left axillary lymphadenopathy [No Edema] : no edema [No Swelling] : no swelling [Full ROM] : full range of motion [No Rashes] : no rashes [No Ulceration] : no ulceration [de-identified] : Right- reduction scar healed well with fat necrosis above NAC. R- axillary incision healing well. Great ROM.  Left mastectomy with TRAM reconstruction- revision healed well. [TextEntry] : Psych: Appropriate, NAD, A&Ox3 Neuro: Intact grossly, gait normal

## 2024-08-14 NOTE — HISTORY OF PRESENT ILLNESS
[FreeTextEntry1] : Referred by Dr. Светлана Campos PCP: Dr. 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 She is s/p 1/25/24 R lumpectomy and SLNBx and oncoplastics per Dr. Conde for R 2:00 ILC, ER 90% and ID 5%, [id number] neg. 1/25/24 Surgical path: ILC 2.7cm Gr2, negative margins, closest 2mm-Superior. LCIS classic. No DCIS. Addl anterior margin with intraductal papilloma and FA. Addl medial margin with intraductal papilloma. Reactive changes from prior core bx. 0/1LN negative. pT2N0/1  Completed RT with Dr. Antonio 4/10/24.  Oncotype DX Recurrence Score resulted as 20, <1% CT benefit. Seeing Dr. Bradford, started on Aromasin. Per Dr. Bradford's last note pt saw her PCP for elevated LFTs, CT abd was performed no liver lesion however sclerotic lesion in L3 which prompted PET/CT order. PET/CT NFR 7/8/24 was negative for metastatic disease. She did undergo genetic counseling given her personal history and +VUS.  Hx of L mastectomy in 1982, was 48 y/o. implant capsule formation x2 and then TRAM in 1990's at [facility]. Pt never had chemo or hormonal treatment or radiation. She states all her LN were removed but doesn't have any hospital records.  Pt had hysterectomy with Dr. Campos for uterine cancer 5 years ago.  11/30/23 [facility] 2:00 US bx: Invasive lobular carcinoma, Gr2, measures at least 12mm. No lymphovascular permeation seen. ER 90% and ID 5%, [id number] neg. Concordant. Rec surgical or oncological management.  7/8/24 [facility], PET/CT: S/p right breast lumpectomy. Mild activity in surgical site is most likely associated with postop changes. Suggest to f/u. Post radiation inflammation in the anterior right upper lobe. Otherwise no FDG avid LN or lesion in the field of view to suggest metastatic dx.   8/1/24 NFR, R dmmg and limited US: Het dense. No susp mass, microcals or other sign of malignancy is identified. Post surgical changes are noted. R US: No suspicious solid mass. Postsurgical changes with fat necrosis is seen. No underlying fluid collection. Impression: no mmg or US evidence of malignancy. Rec mmg in 1yr. 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 Pt is AJ.

## 2024-08-14 NOTE — DATA REVIEWED
[FreeTextEntry1] : 7/8/24 [facility], PET/CT: S/p right breast lumpectomy. Mild activity in surgical site is most likely associated with postop changes. Suggest to f/u. Post radiation inflammation in the anterior right upper lobe. Otherwise no FDG avid LN or lesion in the field of view to suggest metastatic dx.   8/1/24 NFR, R dmmg and limited US: Het dense. No susp mass, microcals or other sign of malignancy is identified. Post surgical changes are noted. R US: No suspicious solid mass. Postsurgical changes with fat necrosis is seen. No underlying fluid collection. Impression: no mmg or US evidence of malignancy. Rec mmg in 1yr. BR2

## 2024-08-14 NOTE — ASSESSMENT
[FreeTextEntry1] : Referred by Dr. Светлана Campos PCP: Dr. 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 She is s/p 1/25/24 R lumpectomy and SLNBx and oncoplastics per Dr. Conde for R 2:00 ILC, ER 90% and OK 5%, [id number] neg. 1/25/24 Surgical path: ILC 2.7cm Gr2, negative margins, closest 2mm-Superior. LCIS classic. No DCIS. Addl anterior margin with intraductal papilloma and FA. Addl medial margin with intraductal papilloma. Reactive changes from prior core bx. 0/1LN negative. pT2N0/1  Completed RT with Dr. Antonio 4/10/24.  Oncotype DX Recurrence Score resulted as 20, <1% CT benefit. Seeing Dr. Bradford, started on Aromasin. Per Dr. Bradford's last note pt saw her PCP for elevated LFTs, CT abd was performed no liver lesion however sclerotic lesion in L3 which prompted PET/CT order. PET/CT NFR 7/8/24 was negative for metastatic disease. She did undergo genetic counseling given her personal history and +VUS.  Hx of L mastectomy in 1982, was 46 y/o. implant capsule formation x2 and then TRAM in 1990's at [facility]. Pt never had chemo or hormonal treatment or radiation. She states all her LN were removed but doesn't have any hospital records.  Pt had hysterectomy with Dr. Campos for uterine cancer 5 years ago.  11/30/23 [facility] 2:00 US bx: Invasive lobular carcinoma, Gr2, measures at least 12mm. No lymphovascular permeation seen. ER 90% and OK 5%, [id number] neg. Concordant. Rec surgical or oncological management.  7/8/24 [facility], PET/CT: S/p right breast lumpectomy. Mild activity in surgical site is most likely associated with postop changes. Suggest to f/u. Post radiation inflammation in the anterior right upper lobe. Otherwise no FDG avid LN or lesion in the field of view to suggest metastatic dx.   8/1/24 NFR, R dmmg and limited US: Het dense. No susp mass, microcals or other sign of malignancy is identified. Post surgical changes are noted. R US: No suspicious solid mass. Postsurgical changes with fat necrosis is seen. No underlying fluid collection. Impression: no mmg or US evidence of malignancy. Rec mmg in 1yr. 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 Pt is AJ.  CBE:  Right- reduction scar healed well. Fat necrosis above scar as noted in U/s.  R- axillary incision healing well. Slight erythema s/p radiation. Great ROM.  Left mastectomy with TRAM reconstruction- revision healed well. On Aromasin per Dr. Bradford. To address shoulder stiffness with her.  Had EBRT with DR. Antonio. R dMMG, dense breasts, R u/s due 1/25, follow up after. No SOZO given bilat axillary surgery.  PLAN: F/u Dr. Antonio, Dr. Bradford and Dr. Conde as scheduled.  Right mmg and u/s 1/25 at [facility]. F.u after.

## 2024-08-14 NOTE — HISTORY OF PRESENT ILLNESS
[FreeTextEntry1] : Referred by Dr. Светлана Capmos PCP: Dr. 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 She is s/p 1/25/24 R lumpectomy and SLNBx and oncoplastics per Dr. Conde for R 2:00 ILC, ER 90% and TX 5%, [id number] neg. 1/25/24 Surgical path: ILC 2.7cm Gr2, negative margins, closest 2mm-Superior. LCIS classic. No DCIS. Addl anterior margin with intraductal papilloma and FA. Addl medial margin with intraductal papilloma. Reactive changes from prior core bx. 0/1LN negative. pT2N0/1  Completed RT with Dr. Antonio 4/10/24.  Oncotype DX Recurrence Score resulted as 20, <1% CT benefit. Seeing Dr. Bradford, started on Aromasin. Per Dr. Bradford's last note pt saw her PCP for elevated LFTs, CT abd was performed no liver lesion however sclerotic lesion in L3 which prompted PET/CT order. PET/CT NFR 7/8/24 was negative for metastatic disease. She did undergo genetic counseling given her personal history and +VUS.  Hx of L mastectomy in 1982, was 48 y/o. implant capsule formation x2 and then TRAM in 1990's at [facility]. Pt never had chemo or hormonal treatment or radiation. She states all her LN were removed but doesn't have any hospital records.  Pt had hysterectomy with Dr. Campos for uterine cancer 5 years ago.  11/30/23 [facility] 2:00 US bx: Invasive lobular carcinoma, Gr2, measures at least 12mm. No lymphovascular permeation seen. ER 90% and TX 5%, [id number] neg. Concordant. Rec surgical or oncological management.  7/8/24 [facility], PET/CT: S/p right breast lumpectomy. Mild activity in surgical site is most likely associated with postop changes. Suggest to f/u. Post radiation inflammation in the anterior right upper lobe. Otherwise no FDG avid LN or lesion in the field of view to suggest metastatic dx.   8/1/24 NFR, R dmmg and limited US: Het dense. No susp mass, microcals or other sign of malignancy is identified. Post surgical changes are noted. R US: No suspicious solid mass. Postsurgical changes with fat necrosis is seen. No underlying fluid collection. Impression: no mmg or US evidence of malignancy. Rec mmg in 1yr. 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 Pt is AJ.

## 2024-09-04 ENCOUNTER — APPOINTMENT (OUTPATIENT)
Dept: PLASTIC SURGERY | Facility: CLINIC | Age: 89
End: 2024-09-04
Payer: MEDICARE

## 2024-09-04 VITALS
OXYGEN SATURATION: 96 % | HEIGHT: 60 IN | BODY MASS INDEX: 25.52 KG/M2 | TEMPERATURE: 97.9 F | HEART RATE: 96 BPM | SYSTOLIC BLOOD PRESSURE: 124 MMHG | WEIGHT: 130 LBS | DIASTOLIC BLOOD PRESSURE: 68 MMHG

## 2024-09-04 DIAGNOSIS — N64.1 FAT NECROSIS OF BREAST: ICD-10-CM

## 2024-09-04 DIAGNOSIS — C50.211 MALIGNANT NEOPLASM OF UPPER-INNER QUADRANT OF RIGHT FEMALE BREAST: ICD-10-CM

## 2024-09-04 DIAGNOSIS — Z09 ENCOUNTER FOR FOLLOW-UP EXAMINATION AFTER COMPLETED TREATMENT FOR CONDITIONS OTHER THAN MALIGNANT NEOPLASM: ICD-10-CM

## 2024-09-04 DIAGNOSIS — Z90.12 ACQUIRED ABSENCE OF LEFT BREAST AND NIPPLE: ICD-10-CM

## 2024-09-04 PROCEDURE — 99213 OFFICE O/P EST LOW 20 MIN: CPT

## 2024-10-09 ENCOUNTER — APPOINTMENT (OUTPATIENT)
Dept: PLASTIC SURGERY | Facility: CLINIC | Age: 89
End: 2024-10-09
Payer: MEDICARE

## 2024-10-09 VITALS
DIASTOLIC BLOOD PRESSURE: 74 MMHG | WEIGHT: 130 LBS | SYSTOLIC BLOOD PRESSURE: 136 MMHG | HEART RATE: 97 BPM | BODY MASS INDEX: 25.52 KG/M2 | OXYGEN SATURATION: 95 % | HEIGHT: 60 IN

## 2024-10-09 DIAGNOSIS — N64.1 FAT NECROSIS OF BREAST: ICD-10-CM

## 2024-10-09 DIAGNOSIS — L90.5 SCAR CONDITIONS AND FIBROSIS OF SKIN: ICD-10-CM

## 2024-10-09 PROCEDURE — 99213 OFFICE O/P EST LOW 20 MIN: CPT

## 2024-10-24 ENCOUNTER — OFFICE (OUTPATIENT)
Dept: URBAN - METROPOLITAN AREA CLINIC 8 | Facility: CLINIC | Age: 89
Setting detail: OPHTHALMOLOGY
End: 2024-10-24
Payer: MEDICARE

## 2024-10-24 DIAGNOSIS — H43.811: ICD-10-CM

## 2024-10-24 DIAGNOSIS — D31.31: ICD-10-CM

## 2024-10-24 DIAGNOSIS — Z96.1: ICD-10-CM

## 2024-10-24 DIAGNOSIS — H52.4: ICD-10-CM

## 2024-10-24 DIAGNOSIS — H35.373: ICD-10-CM

## 2024-10-24 PROCEDURE — 92015 DETERMINE REFRACTIVE STATE: CPT | Performed by: OPHTHALMOLOGY

## 2024-10-24 PROCEDURE — 92014 COMPRE OPH EXAM EST PT 1/>: CPT | Performed by: OPHTHALMOLOGY

## 2024-10-24 PROCEDURE — 92134 CPTRZ OPH DX IMG PST SGM RTA: CPT | Performed by: OPHTHALMOLOGY

## 2024-10-24 ASSESSMENT — REFRACTION_CURRENTRX
OS_CYLINDER: -3.50
OS_CYLINDER: -3.25
OS_SPHERE: -0.50
OD_ADD: +2.50
OS_SPHERE: -0.75
OD_OVR_VA: 20/
OS_CYLINDER: -4.50
OS_AXIS: 145
OD_SPHERE: +0.75
OS_OVR_VA: 20/
OD_VPRISM_DIRECTION: PROGS
OS_ADD: +2.75
OS_VPRISM_DIRECTION: PROGS
OD_ADD: +3.00
OD_ADD: +2.75
OD_OVR_VA: 20/
OD_OVR_VA: 20/
OD_VPRISM_DIRECTION: PROGS
OS_ADD: +3.00
OD_VPRISM_DIRECTION: PROGS
OS_SPHERE: -0.50
OD_CYLINDER: -1.00
OD_SPHERE: +0.50
OD_CYLINDER: SPHERE
OD_AXIS: 105
OS_OVR_VA: 20/
OS_VPRISM_DIRECTION: PROGS
OD_SPHERE: +0.25
OD_AXIS: 113
OS_VPRISM_DIRECTION: PROGS
OS_AXIS: 147
OS_AXIS: 145
OD_CYLINDER: -0.25
OS_ADD: +2.50
OS_OVR_VA: 20/

## 2024-10-24 ASSESSMENT — REFRACTION_MANIFEST
OD_CYLINDER: -1.00
OD_VA2: 20/25
OD_VA2: 20/25
OS_ADD: +2.75
OD_ADD: +2.75
OS_ADD: +2.75
OD_VA1: 20/40
OD_SPHERE: +0.75
OD_AXIS: 100
OD_ADD: +2.75
OS_AXIS: 145
OS_VA1: 20/70
OS_AXIS: 145
OU_VA: 20/30
OS_SPHERE: -0.50
OS_VA2: 20/25
OD_CYLINDER: -1.00
OD_VA1: 20/40
OS_VA2: 20/25
OD_SPHERE: +0.75
OU_VA: 20/30
OS_CYLINDER: -4.00
OD_AXIS: 085
OS_CYLINDER: -4.00
OS_SPHERE: -0.50
OS_VA1: 20/70

## 2024-10-24 ASSESSMENT — REFRACTION_AUTOREFRACTION
OS_SPHERE: -0.75
OD_AXIS: 100
OD_SPHERE: +0.75
OD_CYLINDER: -1.00
OS_CYLINDER: -4.25
OS_AXIS: 144

## 2024-10-24 ASSESSMENT — KERATOMETRY
OD_K1POWER_DIOPTERS: 41.75
OD_K2POWER_DIOPTERS: 41.75
METHOD_AUTO_MANUAL: AUTO
OS_K1POWER_DIOPTERS: 40.00
OS_AXISANGLE_DEGREES: 056
OS_K2POWER_DIOPTERS: 44.25
OD_AXISANGLE_DEGREES: 090

## 2024-10-24 ASSESSMENT — VISUAL ACUITY
OS_BCVA: 20/40+
OD_BCVA: 20/80+

## 2024-10-24 ASSESSMENT — CONFRONTATIONAL VISUAL FIELD TEST (CVF)
OS_FINDINGS: FULL
OD_FINDINGS: FULL

## 2025-01-08 ENCOUNTER — APPOINTMENT (OUTPATIENT)
Dept: PLASTIC SURGERY | Facility: CLINIC | Age: 89
End: 2025-01-08
Payer: MEDICARE

## 2025-01-08 VITALS
BODY MASS INDEX: 25.05 KG/M2 | SYSTOLIC BLOOD PRESSURE: 150 MMHG | OXYGEN SATURATION: 94 % | DIASTOLIC BLOOD PRESSURE: 80 MMHG | WEIGHT: 128.25 LBS | TEMPERATURE: 97.8 F | HEART RATE: 93 BPM

## 2025-01-08 DIAGNOSIS — Z90.12 ACQUIRED ABSENCE OF LEFT BREAST AND NIPPLE: ICD-10-CM

## 2025-01-08 DIAGNOSIS — Z98.890 OTHER SPECIFIED POSTPROCEDURAL STATES: ICD-10-CM

## 2025-01-08 PROCEDURE — 99214 OFFICE O/P EST MOD 30 MIN: CPT

## 2025-01-21 ENCOUNTER — RESULT REVIEW (OUTPATIENT)
Age: 89
End: 2025-01-21

## 2025-01-21 ENCOUNTER — APPOINTMENT (OUTPATIENT)
Dept: ULTRASOUND IMAGING | Facility: CLINIC | Age: 89
End: 2025-01-21
Payer: MEDICARE

## 2025-01-21 ENCOUNTER — APPOINTMENT (OUTPATIENT)
Dept: MAMMOGRAPHY | Facility: CLINIC | Age: 89
End: 2025-01-21
Payer: MEDICARE

## 2025-01-21 PROCEDURE — 77065 DX MAMMO INCL CAD UNI: CPT | Mod: RT

## 2025-01-21 PROCEDURE — G0279: CPT | Mod: RT

## 2025-01-21 PROCEDURE — 76642 ULTRASOUND BREAST LIMITED: CPT | Mod: RT

## 2025-01-27 ENCOUNTER — APPOINTMENT (OUTPATIENT)
Dept: BREAST CENTER | Facility: CLINIC | Age: 89
End: 2025-01-27

## 2025-02-12 ENCOUNTER — APPOINTMENT (OUTPATIENT)
Dept: BREAST CENTER | Facility: CLINIC | Age: 89
End: 2025-02-12
Payer: MEDICARE

## 2025-02-12 VITALS
BODY MASS INDEX: 24.54 KG/M2 | OXYGEN SATURATION: 91 % | HEIGHT: 60 IN | HEART RATE: 88 BPM | SYSTOLIC BLOOD PRESSURE: 157 MMHG | WEIGHT: 125 LBS | TEMPERATURE: 98 F | DIASTOLIC BLOOD PRESSURE: 91 MMHG

## 2025-02-12 DIAGNOSIS — C50.211 MALIGNANT NEOPLASM OF UPPER-INNER QUADRANT OF RIGHT FEMALE BREAST: ICD-10-CM

## 2025-02-12 DIAGNOSIS — Z80.3 FAMILY HISTORY OF MALIGNANT NEOPLASM OF BREAST: ICD-10-CM

## 2025-02-12 PROCEDURE — 99214 OFFICE O/P EST MOD 30 MIN: CPT

## 2025-02-18 DIAGNOSIS — R92.331 MAMMOGRAPHIC HETEROGENEOUS DENSITY, RIGHT BREAST: ICD-10-CM

## 2025-04-22 ENCOUNTER — APPOINTMENT (OUTPATIENT)
Dept: ORTHOPEDIC SURGERY | Facility: CLINIC | Age: 89
End: 2025-04-22
Payer: MEDICARE

## 2025-04-22 DIAGNOSIS — M19.041 PRIMARY OSTEOARTHRITIS, RIGHT HAND: ICD-10-CM

## 2025-04-22 DIAGNOSIS — I10 ESSENTIAL (PRIMARY) HYPERTENSION: ICD-10-CM

## 2025-04-22 PROCEDURE — 99203 OFFICE O/P NEW LOW 30 MIN: CPT

## 2025-04-22 PROCEDURE — 73130 X-RAY EXAM OF HAND: CPT | Mod: RT

## 2025-04-22 RX ORDER — MELOXICAM 15 MG/1
TABLET ORAL
Refills: 0 | Status: ACTIVE | COMMUNITY

## 2025-07-09 ENCOUNTER — APPOINTMENT (OUTPATIENT)
Dept: MRI IMAGING | Facility: CLINIC | Age: 89
End: 2025-07-09

## 2025-07-09 PROCEDURE — 77049 MRI BREAST C-+ W/CAD BI: CPT | Mod: TC

## 2025-07-09 PROCEDURE — A9585: CPT

## 2025-07-15 ENCOUNTER — NON-APPOINTMENT (OUTPATIENT)
Age: 89
End: 2025-07-15

## 2025-07-22 PROBLEM — Z78.9 NO FAMILY HISTORY OF MALIGNANT NEOPLASM OF BREAST: Status: ACTIVE | Noted: 2025-07-22

## 2025-07-23 ENCOUNTER — APPOINTMENT (OUTPATIENT)
Dept: BREAST CENTER | Facility: CLINIC | Age: 89
End: 2025-07-23
Payer: MEDICARE

## 2025-07-23 VITALS
DIASTOLIC BLOOD PRESSURE: 71 MMHG | WEIGHT: 128 LBS | OXYGEN SATURATION: 94 % | TEMPERATURE: 97.5 F | HEIGHT: 60 IN | SYSTOLIC BLOOD PRESSURE: 147 MMHG | BODY MASS INDEX: 25.13 KG/M2 | HEART RATE: 96 BPM

## 2025-07-23 DIAGNOSIS — C50.211 MALIGNANT NEOPLASM OF UPPER-INNER QUADRANT OF RIGHT FEMALE BREAST: ICD-10-CM

## 2025-07-23 DIAGNOSIS — Z80.3 FAMILY HISTORY OF MALIGNANT NEOPLASM OF BREAST: ICD-10-CM

## 2025-07-23 DIAGNOSIS — Z78.9 OTHER SPECIFIED HEALTH STATUS: ICD-10-CM

## 2025-07-23 PROCEDURE — 99214 OFFICE O/P EST MOD 30 MIN: CPT

## 2025-07-23 RX ORDER — CYANOCOBALAMIN (VITAMIN B-12) 1000 MCG
TABLET ORAL
Refills: 0 | Status: ACTIVE | COMMUNITY

## 2025-07-23 RX ORDER — CALCIUM CARBONATE/VITAMIN D3 500 MG-2.5
TABLET,CHEWABLE ORAL
Refills: 0 | Status: ACTIVE | COMMUNITY

## 2025-07-23 RX ORDER — COLD-HOT PACK
EACH MISCELLANEOUS
Refills: 0 | Status: ACTIVE | COMMUNITY